# Patient Record
Sex: FEMALE | Race: WHITE
[De-identification: names, ages, dates, MRNs, and addresses within clinical notes are randomized per-mention and may not be internally consistent; named-entity substitution may affect disease eponyms.]

---

## 2020-10-09 ENCOUNTER — HOSPITAL ENCOUNTER (OUTPATIENT)
Dept: HOSPITAL 76 - DI | Age: 65
Discharge: HOME | End: 2020-10-09
Attending: NURSE PRACTITIONER
Payer: COMMERCIAL

## 2020-10-09 DIAGNOSIS — H81.09: Primary | ICD-10-CM

## 2020-10-09 PROCEDURE — 70551 MRI BRAIN STEM W/O DYE: CPT

## 2020-10-09 NOTE — MRI REPORT
PROCEDURE:  Brain W/O

 

INDICATIONS:  MENIERE'S DISEASE

 

TECHNIQUE:  

Noncontrast axial T1 spin echo, axial T2 fast spin echo, sagittal and axial FLAIR, coronal T2 fast sp
in echo, axial gradient echo, axial diffusion and ADC through the brain.  In this patient, thin secti
on axial FFE 3-D images were obtained through the skull base.

 

COMPARISON:  None.

 

FINDINGS:  

Image quality:  Excellent.  

 

CSF Spaces:  Basal cisterns are patent.  No extra-axial fluid collections.  Ventricles are normal in 
size and shape.  

 

Brain:  No intracranial masses or hemorrhage.  Gray/white matter interface is normal.  Brainstem appe
ars normal.  Diffusion-weighted images demonstrate no acute ischemic insult. Mild brain parenchymal v
olume loss is seen. Mild chronic small vessel ischemic changes can be seen. No chronic ischemic insul
ts.  Normal intravascular flow voids are present.  

 

In this patient with this given history, scrutiny is given to the internal auditory canals and the ce
rebellopontine angle cisterns. To limits of this study that is performed without IV contrast, no mass
es can be seen within these regions.

 

Skull and face:  Calvarium has normal marrow signal.  Orbits appear normal.  

 

Sinuses:  Sinuses and mastoids are clear.  

 

 

 

IMPRESSION:  

 

No imaging explanation is found for the patient's presenting symptoms.

 

Reviewed by: Eric Valenzuela MD on 10/9/2020 2:58 PM STEPAN

Approved by: Eric Valenzuela MD on 10/9/2020 2:58 PM STEPAN

 

 

Station ID:  [id number]

## 2020-11-20 ENCOUNTER — HOSPITAL ENCOUNTER (OUTPATIENT)
Dept: HOSPITAL 76 - DI | Age: 65
Discharge: HOME | End: 2020-11-20
Attending: STUDENT IN AN ORGANIZED HEALTH CARE EDUCATION/TRAINING PROGRAM
Payer: COMMERCIAL

## 2020-11-20 DIAGNOSIS — M48.02: ICD-10-CM

## 2020-11-20 DIAGNOSIS — M47.812: Primary | ICD-10-CM

## 2020-11-20 DIAGNOSIS — M50.31: ICD-10-CM

## 2020-11-20 PROCEDURE — 72141 MRI NECK SPINE W/O DYE: CPT

## 2020-11-20 NOTE — MRI REPORT
PROCEDURE:  Cervical Spine W/O

 

INDICATIONS:  CERVICAL RADICULOPATHY, RT SIDED ARM WEAKNESS

 

TECHNIQUE:  

Noncontrast sagittal T1 spin echo and T2 fast spin echo, sagittal STIR, foraminal oblique sagittal T2
 fast spin echo, and axial gradient echo or T2 fast spin echo through the cervical spine.  

 

COMPARISON:  None.

 

FINDINGS:  

Image quality:  Excellent.  

 

Alignment and Curvature:  There is normal bony alignment.  

 

Bone Marrow: Reactive endplate changes noted adjacent to the C5-C6 disc.

 

Spinal Cord:  Visualized spinal cord has normal size and signal.  No cerebellar tonsillar herniation.
  

 

Paraspinous Soft Tissues:  No paravertebral masses.  Prevertebral soft tissues are normal in thicknes
s.  

 

C2-C3:  Loss of disc signal. Mild bilateral facet hypertrophy. No central stenosis. No neural foramin
al narrowing. No neural compression.  

 

C3-C4:   Loss of disc signal. Mild to moderate diffuse disc bulge. Mild bilateral facet hypertrophy. 
Mild narrowing of the central canal. No neural foraminal narrowing. No neural compression.

 

C4-C5:  Loss of disc signal and slight loss of disc height. Mild bilateral facet hypertrophy. No cent
ral stenosis. Moderate left neural foraminal narrowing. No neural compression.

 

C5-C6:  Loss of disc signal and height. Mild to moderate diffuse disc bulge. Moderate narrowing of th
e central canal. Mild bilateral facet hypertrophy. Mild bilateral uncovertebral joint hypertrophy. Se
toni right and moderate left neural foraminal narrowing with compression of the exiting right C6 nerv
e root.

 

C6-C7:  Loss of disc signal. Mild, diffuse disc bulge. Mild bilateral facet hypertrophy. Mild central
 canal. No neural foraminal narrowing. No neural compression.

 

C7-T1:  Loss of disc signal. Minimal, diffuse disc bulge. Mild bilateral facet hypertrophy. No centra
l stenosis. No neuroforaminal narrowing. No neural compression.

 

 

IMPRESSION:  

 

1. Multilevel degenerative disc disease.

 

2. Multilevel facet arthropathy.

 

3. Moderate C5-C6 central canal narrowing.

 

4. Severe right C5-C6 neural foraminal narrowing with compression of the exiting right C6 nerve root.


 

 

 

 

Reviewed by: Swathi Miller MD, PhD on 11/20/2020 12:24 PM PST

Approved by: Swathi Miller MD, PhD on 11/20/2020 12:24 PM PST

 

 

Station ID:  [id number]

## 2021-04-23 ENCOUNTER — HOSPITAL ENCOUNTER (OUTPATIENT)
Dept: HOSPITAL 76 - DI | Age: 66
Discharge: HOME | End: 2021-04-23
Attending: STUDENT IN AN ORGANIZED HEALTH CARE EDUCATION/TRAINING PROGRAM
Payer: COMMERCIAL

## 2021-04-23 DIAGNOSIS — Z12.31: Primary | ICD-10-CM

## 2021-04-26 NOTE — MAMMOGRAPHY REPORT
BILATERAL DIGITAL SCREENING MAMMOGRAM 3D/2D: 4/23/2021

 

CLINICAL: Routine screening.  

 

Comparison is made to exams dated:  1/11/2017 mammogram - [facility] and 2/19/2014 mammogram
 - [facility].  There are scattered fibroglandular elements in both breasts.  

 

No significant masses, calcifications, or other findings are seen in either breast.  

There has been no significant interval change.

 

IMPRESSION: NEGATIVE

There is no mammographic evidence of malignancy. A 1 year screening mammogram is recommended.  

 

 

This exam was interpreted at Station ID: [id number].  

 

NOTE: For mammograms, a report in lay terms will be sent to the patient. Approximately 15% of breast 
malignancies will not be visualized mammographically. In the management of a palpable breast mass, a 
negative mammogram must not discourage biopsy of a clinically suspicious lesion.

 

Electronically Signed By: Jacobo Frazier M.D.          

slc/penrad:4/23/2021 10:49:06  

 

 

 

ACR BI-RADS Category 1: Negative 3341F

PARENCHYMAL PATTERN: (A) - The breast(s) demonstrate(s) scattered fibroglandular densities.

BI-RADS CATEGORY: (1) - 1

RECOMMENDATION: (ANNUAL)  - Recommend routine annual screening mammography.

[id number]

1 year screening

LATERALITY: (B)